# Patient Record
Sex: FEMALE | Race: BLACK OR AFRICAN AMERICAN | NOT HISPANIC OR LATINO | URBAN - METROPOLITAN AREA
[De-identification: names, ages, dates, MRNs, and addresses within clinical notes are randomized per-mention and may not be internally consistent; named-entity substitution may affect disease eponyms.]

---

## 2022-05-24 ENCOUNTER — NEW PATIENT (OUTPATIENT)
Dept: URBAN - METROPOLITAN AREA CLINIC 24 | Facility: CLINIC | Age: 62
End: 2022-05-24

## 2022-05-24 DIAGNOSIS — H18.713: ICD-10-CM

## 2022-05-24 DIAGNOSIS — H18.623: ICD-10-CM

## 2022-05-24 DIAGNOSIS — H52.4: ICD-10-CM

## 2022-05-24 DIAGNOSIS — H52.213: ICD-10-CM

## 2022-05-24 DIAGNOSIS — H35.352: ICD-10-CM

## 2022-05-24 DIAGNOSIS — H52.13: ICD-10-CM

## 2022-05-24 DIAGNOSIS — H25.043: ICD-10-CM

## 2022-05-24 DIAGNOSIS — H25.13: ICD-10-CM

## 2022-05-24 PROCEDURE — 92015 DETERMINE REFRACTIVE STATE: CPT

## 2022-05-24 PROCEDURE — 92004 COMPRE OPH EXAM NEW PT 1/>: CPT

## 2022-05-24 ASSESSMENT — TONOMETRY
OD_IOP_MMHG: 5
OS_IOP_MMHG: 18

## 2022-05-24 NOTE — PATIENT DISCUSSION
Although patient would benefit from a specialty CL normally, cataracts are sufficiently visually significant at this time that CL will not improve vision functionally.

## 2022-05-24 NOTE — PATIENT DISCUSSION
No CME noted on exam today OD, OS. Suspect media error during testing vs. resolved with topical therapy OS. Recommend to keep follow up appointment with Dr. Washington Berumen to recheck macular OCT as directed.

## 2022-05-24 NOTE — PATIENT DISCUSSION
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

## 2022-05-24 NOTE — PATIENT DISCUSSION
Due to lack of ability to correct vision with RGP today, will forgo CL fitting at this time and instead recommend to pursue cataract surgery as soon as possible for best visual improvement. Patient voiced understanding.